# Patient Record
Sex: MALE | Race: WHITE | NOT HISPANIC OR LATINO
[De-identification: names, ages, dates, MRNs, and addresses within clinical notes are randomized per-mention and may not be internally consistent; named-entity substitution may affect disease eponyms.]

---
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
## 2024-01-26 PROBLEM — Z00.00 ENCOUNTER FOR PREVENTIVE HEALTH EXAMINATION: Status: ACTIVE | Noted: 2024-01-26

## 2024-01-29 ENCOUNTER — NON-APPOINTMENT (OUTPATIENT)
Age: 24
End: 2024-01-29

## 2024-01-29 ENCOUNTER — APPOINTMENT (OUTPATIENT)
Dept: OTOLARYNGOLOGY | Facility: CLINIC | Age: 24
End: 2024-01-29
Payer: COMMERCIAL

## 2024-01-29 VITALS — WEIGHT: 218 LBS | BODY MASS INDEX: 25.74 KG/M2 | HEIGHT: 77 IN

## 2024-01-29 DIAGNOSIS — Z82.49 FAMILY HISTORY OF ISCHEMIC HEART DISEASE AND OTHER DISEASES OF THE CIRCULATORY SYSTEM: ICD-10-CM

## 2024-01-29 DIAGNOSIS — M95.0 ACQUIRED DEFORMITY OF NOSE: ICD-10-CM

## 2024-01-29 PROCEDURE — 99203 OFFICE O/P NEW LOW 30 MIN: CPT | Mod: 25

## 2024-01-29 PROCEDURE — 31231 NASAL ENDOSCOPY DX: CPT

## 2024-01-29 NOTE — REVIEW OF SYSTEMS
[Nasal Congestion] : nasal congestion [Sense Of Smell Problem] : sense of smell problem [Throat Dryness] : throat dryness [Throat Itching] : throat itching [Negative] : Heme/Lymph

## 2024-01-29 NOTE — PHYSICAL EXAM
[Hearing Martinez Test (Tuning Fork On Forehead)] : no lateralization of tone [] : septum deviated bilaterally [Midline] : trachea located in midline position [Normal] : orientation to person, place, and time: normal [Hearing Loss Right Only] : normal [Hearing Loss Left Only] : normal [de-identified] : +balbir. nasal valve collapse. skin on outside of nose [de-identified] :  mildly inflamed turbinates [de-identified] : posterior pharyngeal wall has profuse erythema [de-identified] : class 2 and cryptic [FreeTextEntry2] :  sinuses nontender to percussion.  sensations intact

## 2024-01-29 NOTE — HISTORY OF PRESENT ILLNESS
[de-identified] : Pt presents today stating that he has had sinus issues for a long time. Pt states that he has trouble breathing out of his nostrils- he can only breathe through one nostril at a time. Pt states that he had a CT scan of his sinuses a few years ago. Pt states that a few months ago he had a CT scan of his brain which showed that he had an inflamed polyp. Pt states that he recently lost his sense of smell. Pt denies recent sinus infection or COVID.

## 2024-01-29 NOTE — PROCEDURE
[Recalcitrant Symptoms] : recalcitrant symptoms  [FreeTextEntry6] :  Procedure: Flexible Nasal Endoscopy: Risks, benefits, and alternatives of flexible endoscopy were explained to the patient. The patient gave oral consent to proceed. The flexible scope was inserted into the right nasal cavity. Endoscopy of the inferior and middle meatus was performed. No polyp, mass, or lesion was appreciated. Olfactory cleft was clear. Spheno-ethmoid recess is clear. Nasopharynx was clear. Turbinates were without mass. The procedure was repeated on the contralateral side with similar findings. -deviated septum left with spur posteriorly -middle turbinate enlarged left middle meatus is normal bilaterally -deviated septum right greater than left pushing on middle turbinate -middle turbinate on right smaller than left

## 2024-01-29 NOTE — ASSESSMENT
[FreeTextEntry1] :  Reviewed and reconciled medications, allergies, PMHx, PSHx, SocHx, FMHx.  Pt presents today stating that he has had sinus issues for a long time. Pt states that he has trouble breathing out of his nostrils- he can only breathe through one nostril at a time. Pt states that he had a CT scan of his sinuses a few years ago. Pt states that a few months ago he had a CT scan of his brain which showed that he had an inflamed polyp. Pt states that he recently lost his sense of smell. Pt denies recent sinus infection or COVID.  Physical exam: -deviated septum bilaterally -mildly inflamed turbinates +balbir -nasal valve collapse -skin on outside of nose -posterior pharyngeal wall has profuse erythema -class 2 and cryptic tonsils  ENT Procedure:  Flexible nasal endoscopy -deviated septum left with spur posteriorly -middle turbinate enlarged left middle meatus is normal bilaterally -deviated septum right greater than left pushing on middle turbinate -middle turbinate on right smaller than left  Plan: -CT scan of sinuses ordered -FU with results from CT scan

## 2024-01-30 ENCOUNTER — OUTPATIENT (OUTPATIENT)
Dept: OUTPATIENT SERVICES | Facility: HOSPITAL | Age: 24
LOS: 1 days | End: 2024-01-30
Payer: COMMERCIAL

## 2024-01-30 ENCOUNTER — APPOINTMENT (OUTPATIENT)
Dept: CT IMAGING | Facility: CLINIC | Age: 24
End: 2024-01-30
Payer: COMMERCIAL

## 2024-01-30 DIAGNOSIS — J34.3 HYPERTROPHY OF NASAL TURBINATES: ICD-10-CM

## 2024-01-30 DIAGNOSIS — J34.89 OTHER SPECIFIED DISORDERS OF NOSE AND NASAL SINUSES: ICD-10-CM

## 2024-01-30 DIAGNOSIS — J32.4 CHRONIC PANSINUSITIS: ICD-10-CM

## 2024-01-30 PROCEDURE — 70486 CT MAXILLOFACIAL W/O DYE: CPT

## 2024-01-30 PROCEDURE — 70486 CT MAXILLOFACIAL W/O DYE: CPT | Mod: 26

## 2024-03-11 ENCOUNTER — APPOINTMENT (OUTPATIENT)
Dept: OTOLARYNGOLOGY | Facility: CLINIC | Age: 24
End: 2024-03-11
Payer: COMMERCIAL

## 2024-03-11 VITALS
DIASTOLIC BLOOD PRESSURE: 64 MMHG | BODY MASS INDEX: 25.39 KG/M2 | HEIGHT: 77 IN | WEIGHT: 215 LBS | SYSTOLIC BLOOD PRESSURE: 101 MMHG | HEART RATE: 87 BPM

## 2024-03-11 DIAGNOSIS — R43.0 ANOSMIA: ICD-10-CM

## 2024-03-11 DIAGNOSIS — J34.3 HYPERTROPHY OF NASAL TURBINATES: ICD-10-CM

## 2024-03-11 DIAGNOSIS — J32.4 CHRONIC PANSINUSITIS: ICD-10-CM

## 2024-03-11 DIAGNOSIS — J34.2 DEVIATED NASAL SEPTUM: ICD-10-CM

## 2024-03-11 DIAGNOSIS — J31.0 CHRONIC RHINITIS: ICD-10-CM

## 2024-03-11 DIAGNOSIS — J34.89 OTHER SPECIFIED DISORDERS OF NOSE AND NASAL SINUSES: ICD-10-CM

## 2024-03-11 PROCEDURE — 99214 OFFICE O/P EST MOD 30 MIN: CPT

## 2024-03-11 PROCEDURE — 99213 OFFICE O/P EST LOW 20 MIN: CPT

## 2024-03-11 NOTE — ASSESSMENT
[FreeTextEntry1] :  Reviewed and reconciled medications, allergies, PMHx, PSHx, SocHx, FMHx.  Pt with h/o sinus issues, anosmia, and nasal obstruction presents today to discuss results from a CT scan of his sinuses. Patient states he has difficulty breathing through his nose. Patient denies any dental issues or previous dental extracts. Patient states he is getting his wisdom teeth taken out on 03/18/24. Patient has previously been on antibiotics for this and has used nasal sprays with no relief. Patient states he is  and has been suffering with this [place], and he needs a permanent fix for this issue.  CT sinuses 01/30/24: -narrowing right frontal sinus outflow -mucosal thickening left frontal recess -bilateral mucosal thickening sphenoid recess -huge cysts left sphenoid -right maxillary almost completely opacified -left maxillary polyp -significant mucosal thickening along the nasal passages, left greater than right  Plan: CT Scan results discussed with patient. R/B/A of sinus surgery, balloon, septum, turbs, and polyp removal discussed with patient. Greater than 50% of visit spent is discussion with patient.

## 2024-03-11 NOTE — ADDENDUM
[FreeTextEntry1] :  Documented by Praneeth Arevalo acting as scribe for Dr. Clark on 03/11/2024. All Medical record entries made by the Scribe were at my, Dr. Clark, direction and personally dictated by me on 03/11/2024 . I have reviewed the chart and agree that the record accurately reflects my personal performance of the history, physical exam, assessment and plan. I have also personally directed, reviewed, and agreed with the discharge instructions.

## 2024-03-11 NOTE — HISTORY OF PRESENT ILLNESS
[de-identified] : Pt with h/o sinus issues, anosmia, and nasal obstruction presents today to discuss results from a CT scan of his sinuses. Patient states he has difficulty breathing through his nose. Patient denies any dental issues or previous dental extracts. Patient states he is getting his wisdom teeth taken out on 03/18/24. Patient has previously been on antibiotics for this and has used nasal sprays with no relief. Patient states he is  and has been suffering with this because [place], and he needs a permanent fix for this issue.

## 2024-03-15 DIAGNOSIS — R93.0 ABNORMAL FINDINGS ON DIAGNOSTIC IMAGING OF SKULL AND HEAD, NOT ELSEWHERE CLASSIFIED: ICD-10-CM

## 2024-03-15 DIAGNOSIS — R90.89 OTHER ABNORMAL FINDINGS ON DIAGNOSTIC IMAGING OF CENTRAL NERVOUS SYSTEM: ICD-10-CM

## 2024-03-17 ENCOUNTER — NON-APPOINTMENT (OUTPATIENT)
Age: 24
End: 2024-03-17

## 2024-03-20 ENCOUNTER — APPOINTMENT (OUTPATIENT)
Dept: MRI IMAGING | Facility: CLINIC | Age: 24
End: 2024-03-20

## 2024-03-26 ENCOUNTER — APPOINTMENT (OUTPATIENT)
Dept: MRI IMAGING | Facility: CLINIC | Age: 24
End: 2024-03-26
Payer: COMMERCIAL

## 2024-03-26 ENCOUNTER — OUTPATIENT (OUTPATIENT)
Dept: OUTPATIENT SERVICES | Facility: HOSPITAL | Age: 24
LOS: 1 days | End: 2024-03-26
Payer: COMMERCIAL

## 2024-03-26 DIAGNOSIS — R93.0 ABNORMAL FINDINGS ON DIAGNOSTIC IMAGING OF SKULL AND HEAD, NOT ELSEWHERE CLASSIFIED: ICD-10-CM

## 2024-03-26 DIAGNOSIS — R90.89 OTHER ABNORMAL FINDINGS ON DIAGNOSTIC IMAGING OF CENTRAL NERVOUS SYSTEM: ICD-10-CM

## 2024-03-26 PROCEDURE — 70553 MRI BRAIN STEM W/O & W/DYE: CPT | Mod: 26

## 2024-03-26 PROCEDURE — A9585: CPT

## 2024-03-26 PROCEDURE — 70553 MRI BRAIN STEM W/O & W/DYE: CPT

## 2024-05-06 ENCOUNTER — APPOINTMENT (OUTPATIENT)
Dept: OTOLARYNGOLOGY | Facility: CLINIC | Age: 24
End: 2024-05-06

## 2024-11-12 ENCOUNTER — LABORATORY RESULT (OUTPATIENT)
Age: 24
End: 2024-11-12

## 2024-11-19 ENCOUNTER — RESULT REVIEW (OUTPATIENT)
Age: 24
End: 2024-11-19

## 2024-11-19 ENCOUNTER — APPOINTMENT (OUTPATIENT)
Dept: OTOLARYNGOLOGY | Facility: AMBULATORY MEDICAL SERVICES | Age: 24
End: 2024-11-19

## 2024-11-19 DIAGNOSIS — G89.18 OTHER ACUTE POSTPROCEDURAL PAIN: ICD-10-CM

## 2024-11-19 PROBLEM — Z98.890 POST-OPERATIVE STATE: Status: ACTIVE | Noted: 2024-11-19

## 2024-11-19 PROCEDURE — 30802 ABLATE INF TURBINATE SUBMUC: CPT | Mod: 50

## 2024-11-19 PROCEDURE — 31295 NSL/SINS NDSC SURG MAX SINS: CPT | Mod: LT

## 2024-11-19 PROCEDURE — 31297 NSL/SINS NDSC SURG SPHN SINS: CPT | Mod: 50

## 2024-11-19 PROCEDURE — 31267 ENDOSCOPY MAXILLARY SINUS: CPT | Mod: RT

## 2024-11-19 PROCEDURE — 30520 REPAIR OF NASAL SEPTUM: CPT

## 2024-11-19 PROCEDURE — 31296 NSL/SINS NDSC SURG FRNT SINS: CPT | Mod: 50

## 2024-11-19 RX ORDER — ACETAMINOPHEN AND CODEINE PHOSPHATE 300; 30 MG/1; MG/1
300-30 TABLET ORAL
Qty: 30 | Refills: 0 | Status: ACTIVE | COMMUNITY
Start: 2024-11-19 | End: 1900-01-01

## 2024-11-19 RX ORDER — CEPHALEXIN 500 MG/1
500 CAPSULE ORAL
Qty: 20 | Refills: 0 | Status: ACTIVE | COMMUNITY
Start: 2024-11-19 | End: 1900-01-01

## 2024-11-26 ENCOUNTER — APPOINTMENT (OUTPATIENT)
Dept: OTOLARYNGOLOGY | Facility: CLINIC | Age: 24
End: 2024-11-26

## 2024-11-27 ENCOUNTER — APPOINTMENT (OUTPATIENT)
Dept: OTOLARYNGOLOGY | Facility: CLINIC | Age: 24
End: 2024-11-27
Payer: COMMERCIAL

## 2024-11-27 VITALS
BODY MASS INDEX: 25.39 KG/M2 | DIASTOLIC BLOOD PRESSURE: 77 MMHG | WEIGHT: 215 LBS | HEART RATE: 84 BPM | SYSTOLIC BLOOD PRESSURE: 144 MMHG | HEIGHT: 77 IN

## 2024-11-27 DIAGNOSIS — Z98.890 OTHER SPECIFIED POSTPROCEDURAL STATES: ICD-10-CM

## 2024-11-27 PROCEDURE — 99024 POSTOP FOLLOW-UP VISIT: CPT

## 2024-12-11 ENCOUNTER — NON-APPOINTMENT (OUTPATIENT)
Age: 24
End: 2024-12-11

## 2024-12-11 ENCOUNTER — APPOINTMENT (OUTPATIENT)
Dept: OTOLARYNGOLOGY | Facility: CLINIC | Age: 24
End: 2024-12-11
Payer: COMMERCIAL

## 2024-12-11 VITALS
WEIGHT: 215 LBS | HEART RATE: 91 BPM | BODY MASS INDEX: 25.39 KG/M2 | HEIGHT: 77 IN | SYSTOLIC BLOOD PRESSURE: 124 MMHG | DIASTOLIC BLOOD PRESSURE: 79 MMHG

## 2024-12-11 DIAGNOSIS — J32.4 CHRONIC PANSINUSITIS: ICD-10-CM

## 2024-12-11 DIAGNOSIS — J31.0 CHRONIC RHINITIS: ICD-10-CM

## 2024-12-11 DIAGNOSIS — Z98.890 OTHER SPECIFIED POSTPROCEDURAL STATES: ICD-10-CM

## 2024-12-11 PROCEDURE — 99213 OFFICE O/P EST LOW 20 MIN: CPT | Mod: 25

## 2025-01-03 ENCOUNTER — NON-APPOINTMENT (OUTPATIENT)
Age: 25
End: 2025-01-03

## 2025-01-03 ENCOUNTER — APPOINTMENT (OUTPATIENT)
Dept: OTOLARYNGOLOGY | Facility: CLINIC | Age: 25
End: 2025-01-03
Payer: COMMERCIAL

## 2025-01-03 VITALS
HEIGHT: 77 IN | WEIGHT: 221 LBS | SYSTOLIC BLOOD PRESSURE: 126 MMHG | HEART RATE: 61 BPM | DIASTOLIC BLOOD PRESSURE: 86 MMHG | BODY MASS INDEX: 26.09 KG/M2

## 2025-01-03 DIAGNOSIS — J32.4 CHRONIC PANSINUSITIS: ICD-10-CM

## 2025-01-03 DIAGNOSIS — J34.89 OTHER SPECIFIED DISORDERS OF NOSE AND NASAL SINUSES: ICD-10-CM

## 2025-01-03 DIAGNOSIS — Z98.890 OTHER SPECIFIED POSTPROCEDURAL STATES: ICD-10-CM

## 2025-01-03 DIAGNOSIS — J34.2 DEVIATED NASAL SEPTUM: ICD-10-CM

## 2025-01-03 DIAGNOSIS — J31.0 CHRONIC RHINITIS: ICD-10-CM

## 2025-01-03 DIAGNOSIS — R93.0 ABNORMAL FINDINGS ON DIAGNOSTIC IMAGING OF SKULL AND HEAD, NOT ELSEWHERE CLASSIFIED: ICD-10-CM

## 2025-01-03 PROCEDURE — 99212 OFFICE O/P EST SF 10 MIN: CPT | Mod: 25

## 2025-07-07 ENCOUNTER — APPOINTMENT (OUTPATIENT)
Dept: OTOLARYNGOLOGY | Facility: CLINIC | Age: 25
End: 2025-07-07